# Patient Record
Sex: FEMALE | Race: OTHER | HISPANIC OR LATINO | URBAN - METROPOLITAN AREA
[De-identification: names, ages, dates, MRNs, and addresses within clinical notes are randomized per-mention and may not be internally consistent; named-entity substitution may affect disease eponyms.]

---

## 2019-05-30 ENCOUNTER — APPOINTMENT (RX ONLY)
Dept: URBAN - METROPOLITAN AREA CLINIC 31 | Facility: CLINIC | Age: 59
Setting detail: DERMATOLOGY
End: 2019-05-30

## 2019-05-30 DIAGNOSIS — D485 NEOPLASM OF UNCERTAIN BEHAVIOR OF SKIN: ICD-10-CM

## 2019-05-30 PROBLEM — D48.5 NEOPLASM OF UNCERTAIN BEHAVIOR OF SKIN: Status: ACTIVE | Noted: 2019-05-30

## 2019-05-30 PROCEDURE — ? BIOPSY BY SHAVE METHOD

## 2019-05-30 PROCEDURE — 11102 TANGNTL BX SKIN SINGLE LES: CPT

## 2019-05-30 ASSESSMENT — LOCATION DETAILED DESCRIPTION DERM: LOCATION DETAILED: LEFT LATERAL CANTHUS

## 2019-05-30 ASSESSMENT — LOCATION SIMPLE DESCRIPTION DERM: LOCATION SIMPLE: LEFT EYELID

## 2019-05-30 ASSESSMENT — LOCATION ZONE DERM: LOCATION ZONE: EYELID

## 2019-05-30 NOTE — PROCEDURE: BIOPSY BY SHAVE METHOD
Detail Level: Detailed
Biopsy Method: Dermablade
Bill For Surgical Tray: no
Lab: [id number]
Curettage Text: The wound bed was treated with curettage after the biopsy was performed.
Notification Instructions: Patient will be notified of biopsy results. However, patient instructed to call the office if not contacted within 2 weeks.
Cryotherapy Text: The wound bed was treated with cryotherapy after the biopsy was performed.
Consent: Written consent was obtained and risks were reviewed including but not limited to scarring, infection, bleeding, scabbing, incomplete removal, nerve damage and allergy to anesthesia.
Additional Anesthesia Volume In Cc (Will Not Render If 0): 0
Size Of Lesion In Cm: 0.4
Anesthesia Type: 1% lidocaine with epinephrine
Lab Facility: [id number]
Depth Of Biopsy: dermis
Electrodesiccation Text: The wound bed was treated with electrodesiccation after the biopsy was performed.
Wound Care: Petrolatum
Type Of Destruction Used: Curettage
Anesthesia Volume In Cc: 0.5
Electrodesiccation And Curettage Text: The wound bed was treated with electrodesiccation and curettage after the biopsy was performed.
Billing Type: Third-Party Bill
Biopsy Type: H and E
Silver Nitrate Text: The wound bed was treated with silver nitrate after the biopsy was performed.
Post-Care Instructions: I reviewed with the patient in detail post-care instructions. Patient is to keep the biopsy site dry overnight, and then apply bacitracin twice daily until healed. Patient may apply hydrogen peroxide soaks to remove any crusting.
Dressing: bandage
Hemostasis: Drysol and Electrocautery
Was A Bandage Applied: Yes

## 2019-07-31 PROBLEM — M75.41 IMPINGEMENT SYNDROME OF RIGHT SHOULDER: Status: ACTIVE | Noted: 2019-07-31

## 2020-04-29 ENCOUNTER — APPOINTMENT (RX ONLY)
Dept: URBAN - METROPOLITAN AREA CLINIC 31 | Facility: CLINIC | Age: 60
Setting detail: DERMATOLOGY
End: 2020-04-29

## 2020-04-29 DIAGNOSIS — D485 NEOPLASM OF UNCERTAIN BEHAVIOR OF SKIN: ICD-10-CM

## 2020-04-29 PROBLEM — D48.5 NEOPLASM OF UNCERTAIN BEHAVIOR OF SKIN: Status: ACTIVE | Noted: 2020-04-29

## 2020-04-29 PROCEDURE — 11102 TANGNTL BX SKIN SINGLE LES: CPT

## 2020-04-29 PROCEDURE — ? BIOPSY BY SHAVE METHOD

## 2020-04-29 ASSESSMENT — LOCATION ZONE DERM: LOCATION ZONE: FACE

## 2020-04-29 ASSESSMENT — LOCATION DETAILED DESCRIPTION DERM: LOCATION DETAILED: LEFT CENTRAL TEMPLE

## 2020-04-29 ASSESSMENT — LOCATION SIMPLE DESCRIPTION DERM: LOCATION SIMPLE: LEFT TEMPLE

## 2020-04-29 NOTE — PROCEDURE: BIOPSY BY SHAVE METHOD
Detail Level: Detailed
Depth Of Biopsy: dermis
Was A Bandage Applied: Yes
Size Of Lesion In Cm: 0.3
X Size Of Lesion In Cm: 0
Biopsy Type: H and E
Biopsy Method: Dermablade
Anesthesia Type: 1% lidocaine with epinephrine
Anesthesia Volume In Cc: 0.5
Hemostasis: Drysol and Electrocautery
Wound Care: Aquaphor
Dressing: bandage
Destruction After The Procedure: No
Type Of Destruction Used: Curettage
Curettage Text: The wound bed was treated with curettage after the biopsy was performed.
Cryotherapy Text: The wound bed was treated with cryotherapy after the biopsy was performed.
Electrodesiccation Text: The wound bed was treated with electrodesiccation after the biopsy was performed.
Electrodesiccation And Curettage Text: The wound bed was treated with electrodesiccation and curettage after the biopsy was performed.
Silver Nitrate Text: The wound bed was treated with silver nitrate after the biopsy was performed.
Lab: [id number]
Lab Facility: 
Consent: Written consent was obtained and risks were reviewed including but not limited to scarring, infection, bleeding, scabbing, incomplete removal, nerve damage and allergy to anesthesia.
Post-Care Instructions: I reviewed with the patient in detail post-care instructions. Patient is to keep the biopsy site dry overnight, and then apply bacitracin twice daily until healed. Patient may apply hydrogen peroxide soaks to remove any crusting.
Notification Instructions: Patient will be notified of biopsy results. However, patient instructed to call the office if not contacted within 2 weeks.
Billing Type: Third-Party Bill
Information: Selecting Yes will display possible errors in your note based on the variables you have selected. This validation is only offered as a suggestion for you. PLEASE NOTE THAT THE VALIDATION TEXT WILL BE REMOVED WHEN YOU FINALIZE YOUR NOTE. IF YOU WANT TO FAX A PRELIMINARY NOTE YOU WILL NEED TO TOGGLE THIS TO 'NO' IF YOU DO NOT WANT IT IN YOUR FAXED NOTE.

## 2022-04-12 ENCOUNTER — APPOINTMENT (OUTPATIENT)
Dept: ADMISSIONS | Facility: MEDICAL CENTER | Age: 62
End: 2022-04-12
Payer: COMMERCIAL

## 2022-04-15 ENCOUNTER — PRE-ADMISSION TESTING (OUTPATIENT)
Dept: ADMISSIONS | Facility: MEDICAL CENTER | Age: 62
End: 2022-04-15
Attending: OBSTETRICS & GYNECOLOGY
Payer: COMMERCIAL

## 2022-04-20 ENCOUNTER — ANESTHESIA EVENT (OUTPATIENT)
Dept: SURGERY | Facility: MEDICAL CENTER | Age: 62
End: 2022-04-20
Payer: COMMERCIAL

## 2022-04-20 ENCOUNTER — ANESTHESIA (OUTPATIENT)
Dept: SURGERY | Facility: MEDICAL CENTER | Age: 62
End: 2022-04-20
Payer: COMMERCIAL

## 2022-04-20 ENCOUNTER — HOSPITAL ENCOUNTER (OUTPATIENT)
Facility: MEDICAL CENTER | Age: 62
End: 2022-04-20
Attending: OBSTETRICS & GYNECOLOGY | Admitting: OBSTETRICS & GYNECOLOGY
Payer: COMMERCIAL

## 2022-04-20 VITALS
HEIGHT: 65 IN | SYSTOLIC BLOOD PRESSURE: 148 MMHG | DIASTOLIC BLOOD PRESSURE: 78 MMHG | OXYGEN SATURATION: 95 % | TEMPERATURE: 96.9 F | HEART RATE: 73 BPM | BODY MASS INDEX: 28.61 KG/M2 | WEIGHT: 171.74 LBS | RESPIRATION RATE: 15 BRPM

## 2022-04-20 LAB — PATHOLOGY CONSULT NOTE: NORMAL

## 2022-04-20 PROCEDURE — 501330 HCHG SET, CYSTO IRRIG TUBING: Performed by: OBSTETRICS & GYNECOLOGY

## 2022-04-20 PROCEDURE — 700101 HCHG RX REV CODE 250: Performed by: OBSTETRICS & GYNECOLOGY

## 2022-04-20 PROCEDURE — 160025 RECOVERY II MINUTES (STATS): Performed by: OBSTETRICS & GYNECOLOGY

## 2022-04-20 PROCEDURE — 700101 HCHG RX REV CODE 250: Performed by: ANESTHESIOLOGY

## 2022-04-20 PROCEDURE — 160046 HCHG PACU - 1ST 60 MINS PHASE II: Performed by: OBSTETRICS & GYNECOLOGY

## 2022-04-20 PROCEDURE — 160047 HCHG PACU  - EA ADDL 30 MINS PHASE II: Performed by: OBSTETRICS & GYNECOLOGY

## 2022-04-20 PROCEDURE — 700111 HCHG RX REV CODE 636 W/ 250 OVERRIDE (IP): Performed by: ANESTHESIOLOGY

## 2022-04-20 PROCEDURE — 500892 HCHG PACK, PERI-GYN: Performed by: OBSTETRICS & GYNECOLOGY

## 2022-04-20 PROCEDURE — C1771 REP DEV, URINARY, W/SLING: HCPCS | Performed by: OBSTETRICS & GYNECOLOGY

## 2022-04-20 PROCEDURE — 700105 HCHG RX REV CODE 258: Performed by: ANESTHESIOLOGY

## 2022-04-20 PROCEDURE — 700111 HCHG RX REV CODE 636 W/ 250 OVERRIDE (IP): Performed by: OBSTETRICS & GYNECOLOGY

## 2022-04-20 PROCEDURE — 110454 HCHG SHELL REV 250: Performed by: OBSTETRICS & GYNECOLOGY

## 2022-04-20 PROCEDURE — 160036 HCHG PACU - EA ADDL 30 MINS PHASE I: Performed by: OBSTETRICS & GYNECOLOGY

## 2022-04-20 PROCEDURE — 160029 HCHG SURGERY MINUTES - 1ST 30 MINS LEVEL 4: Performed by: OBSTETRICS & GYNECOLOGY

## 2022-04-20 PROCEDURE — 88304 TISSUE EXAM BY PATHOLOGIST: CPT

## 2022-04-20 PROCEDURE — 160048 HCHG OR STATISTICAL LEVEL 1-5: Performed by: OBSTETRICS & GYNECOLOGY

## 2022-04-20 PROCEDURE — 160002 HCHG RECOVERY MINUTES (STAT): Performed by: OBSTETRICS & GYNECOLOGY

## 2022-04-20 PROCEDURE — 88305 TISSUE EXAM BY PATHOLOGIST: CPT

## 2022-04-20 PROCEDURE — 160041 HCHG SURGERY MINUTES - EA ADDL 1 MIN LEVEL 4: Performed by: OBSTETRICS & GYNECOLOGY

## 2022-04-20 PROCEDURE — 501838 HCHG SUTURE GENERAL: Performed by: OBSTETRICS & GYNECOLOGY

## 2022-04-20 PROCEDURE — 160035 HCHG PACU - 1ST 60 MINS PHASE I: Performed by: OBSTETRICS & GYNECOLOGY

## 2022-04-20 PROCEDURE — 00860 ANES XTRPRTL PX LWR ABD NOS: CPT | Performed by: ANESTHESIOLOGY

## 2022-04-20 PROCEDURE — 160009 HCHG ANES TIME/MIN: Performed by: OBSTETRICS & GYNECOLOGY

## 2022-04-20 PROCEDURE — 700105 HCHG RX REV CODE 258: Performed by: OBSTETRICS & GYNECOLOGY

## 2022-04-20 DEVICE — SYSTEM SUPRAPUBIC MID-URETHRAL SLING LYNX: Type: IMPLANTABLE DEVICE | Site: BLADDER | Status: FUNCTIONAL

## 2022-04-20 RX ORDER — HYDROMORPHONE HYDROCHLORIDE 1 MG/ML
0.2 INJECTION, SOLUTION INTRAMUSCULAR; INTRAVENOUS; SUBCUTANEOUS
Status: DISCONTINUED | OUTPATIENT
Start: 2022-04-20 | End: 2022-04-20 | Stop reason: HOSPADM

## 2022-04-20 RX ORDER — HALOPERIDOL 5 MG/ML
1 INJECTION INTRAMUSCULAR
Status: DISCONTINUED | OUTPATIENT
Start: 2022-04-20 | End: 2022-04-20 | Stop reason: HOSPADM

## 2022-04-20 RX ORDER — ONDANSETRON 2 MG/ML
4 INJECTION INTRAMUSCULAR; INTRAVENOUS ONCE
Status: DISCONTINUED | OUTPATIENT
Start: 2022-04-20 | End: 2022-04-20 | Stop reason: HOSPADM

## 2022-04-20 RX ORDER — OXYCODONE HCL 5 MG/5 ML
10 SOLUTION, ORAL ORAL
Status: DISCONTINUED | OUTPATIENT
Start: 2022-04-20 | End: 2022-04-20 | Stop reason: HOSPADM

## 2022-04-20 RX ORDER — HYDROMORPHONE HYDROCHLORIDE 1 MG/ML
0.4 INJECTION, SOLUTION INTRAMUSCULAR; INTRAVENOUS; SUBCUTANEOUS
Status: DISCONTINUED | OUTPATIENT
Start: 2022-04-20 | End: 2022-04-20 | Stop reason: HOSPADM

## 2022-04-20 RX ORDER — MEPERIDINE HYDROCHLORIDE 25 MG/ML
12.5 INJECTION INTRAMUSCULAR; INTRAVENOUS; SUBCUTANEOUS
Status: DISCONTINUED | OUTPATIENT
Start: 2022-04-20 | End: 2022-04-20 | Stop reason: HOSPADM

## 2022-04-20 RX ORDER — PROMETHAZINE HYDROCHLORIDE 25 MG/1
25 SUPPOSITORY RECTAL EVERY 4 HOURS PRN
Status: DISCONTINUED | OUTPATIENT
Start: 2022-04-20 | End: 2022-04-20 | Stop reason: HOSPADM

## 2022-04-20 RX ORDER — DEXAMETHASONE SODIUM PHOSPHATE 4 MG/ML
INJECTION, SOLUTION INTRA-ARTICULAR; INTRALESIONAL; INTRAMUSCULAR; INTRAVENOUS; SOFT TISSUE PRN
Status: DISCONTINUED | OUTPATIENT
Start: 2022-04-20 | End: 2022-04-20 | Stop reason: SURG

## 2022-04-20 RX ORDER — SODIUM CHLORIDE, SODIUM LACTATE, POTASSIUM CHLORIDE, CALCIUM CHLORIDE 600; 310; 30; 20 MG/100ML; MG/100ML; MG/100ML; MG/100ML
INJECTION, SOLUTION INTRAVENOUS CONTINUOUS
Status: ACTIVE | OUTPATIENT
Start: 2022-04-20 | End: 2022-04-20

## 2022-04-20 RX ORDER — SODIUM CHLORIDE, SODIUM LACTATE, POTASSIUM CHLORIDE, CALCIUM CHLORIDE 600; 310; 30; 20 MG/100ML; MG/100ML; MG/100ML; MG/100ML
INJECTION, SOLUTION INTRAVENOUS CONTINUOUS
Status: DISCONTINUED | OUTPATIENT
Start: 2022-04-20 | End: 2022-04-20 | Stop reason: HOSPADM

## 2022-04-20 RX ORDER — VANCOMYCIN HYDROCHLORIDE 500 MG/10ML
INJECTION, POWDER, LYOPHILIZED, FOR SOLUTION INTRAVENOUS
Status: COMPLETED | OUTPATIENT
Start: 2022-04-20 | End: 2022-04-20

## 2022-04-20 RX ORDER — SODIUM CHLORIDE, SODIUM LACTATE, POTASSIUM CHLORIDE, CALCIUM CHLORIDE 600; 310; 30; 20 MG/100ML; MG/100ML; MG/100ML; MG/100ML
INJECTION, SOLUTION INTRAVENOUS
Status: DISCONTINUED | OUTPATIENT
Start: 2022-04-20 | End: 2022-04-20 | Stop reason: SURG

## 2022-04-20 RX ORDER — PREGABALIN 150 MG/1
150 CAPSULE ORAL EVERY EVENING
COMMUNITY

## 2022-04-20 RX ORDER — ESTRADIOL 0.1 MG/G
CREAM VAGINAL
Status: ON HOLD | COMMUNITY
Start: 2022-03-18 | End: 2022-04-20

## 2022-04-20 RX ORDER — ONDANSETRON 2 MG/ML
INJECTION INTRAMUSCULAR; INTRAVENOUS PRN
Status: DISCONTINUED | OUTPATIENT
Start: 2022-04-20 | End: 2022-04-20 | Stop reason: SURG

## 2022-04-20 RX ORDER — HYDROMORPHONE HYDROCHLORIDE 1 MG/ML
0.1 INJECTION, SOLUTION INTRAMUSCULAR; INTRAVENOUS; SUBCUTANEOUS
Status: DISCONTINUED | OUTPATIENT
Start: 2022-04-20 | End: 2022-04-20 | Stop reason: HOSPADM

## 2022-04-20 RX ORDER — OXYCODONE HCL 5 MG/5 ML
5 SOLUTION, ORAL ORAL
Status: DISCONTINUED | OUTPATIENT
Start: 2022-04-20 | End: 2022-04-20 | Stop reason: HOSPADM

## 2022-04-20 RX ORDER — CEFAZOLIN SODIUM 1 G/3ML
INJECTION, POWDER, FOR SOLUTION INTRAMUSCULAR; INTRAVENOUS PRN
Status: DISCONTINUED | OUTPATIENT
Start: 2022-04-20 | End: 2022-04-20 | Stop reason: SURG

## 2022-04-20 RX ORDER — DIPHENHYDRAMINE HYDROCHLORIDE 50 MG/ML
12.5 INJECTION INTRAMUSCULAR; INTRAVENOUS
Status: DISCONTINUED | OUTPATIENT
Start: 2022-04-20 | End: 2022-04-20 | Stop reason: HOSPADM

## 2022-04-20 RX ORDER — BUPIVACAINE HYDROCHLORIDE AND EPINEPHRINE 2.5; 5 MG/ML; UG/ML
INJECTION, SOLUTION EPIDURAL; INFILTRATION; INTRACAUDAL; PERINEURAL
Status: DISCONTINUED | OUTPATIENT
Start: 2022-04-20 | End: 2022-04-20 | Stop reason: HOSPADM

## 2022-04-20 RX ORDER — GENTAMICIN SULFATE 40 MG/ML
INJECTION, SOLUTION INTRAMUSCULAR; INTRAVENOUS
Status: DISCONTINUED | OUTPATIENT
Start: 2022-04-20 | End: 2022-04-20 | Stop reason: HOSPADM

## 2022-04-20 RX ADMIN — DEXAMETHASONE SODIUM PHOSPHATE 8 MG: 4 INJECTION, SOLUTION INTRA-ARTICULAR; INTRALESIONAL; INTRAMUSCULAR; INTRAVENOUS; SOFT TISSUE at 15:22

## 2022-04-20 RX ADMIN — FENTANYL CITRATE 50 MCG: 50 INJECTION, SOLUTION INTRAMUSCULAR; INTRAVENOUS at 15:10

## 2022-04-20 RX ADMIN — ROCURONIUM BROMIDE 50 MG: 10 INJECTION, SOLUTION INTRAVENOUS at 15:12

## 2022-04-20 RX ADMIN — FENTANYL CITRATE 50 MCG: 50 INJECTION, SOLUTION INTRAMUSCULAR; INTRAVENOUS at 15:53

## 2022-04-20 RX ADMIN — PROPOFOL 100 MG: 10 INJECTION, EMULSION INTRAVENOUS at 15:36

## 2022-04-20 RX ADMIN — ONDANSETRON 8 MG: 2 INJECTION INTRAMUSCULAR; INTRAVENOUS at 15:23

## 2022-04-20 RX ADMIN — CEFAZOLIN 2 G: 330 INJECTION, POWDER, FOR SOLUTION INTRAMUSCULAR; INTRAVENOUS at 15:15

## 2022-04-20 RX ADMIN — SUGAMMADEX 200 MG: 100 INJECTION, SOLUTION INTRAVENOUS at 15:51

## 2022-04-20 RX ADMIN — PROPOFOL 200 MG: 10 INJECTION, EMULSION INTRAVENOUS at 15:11

## 2022-04-20 RX ADMIN — SODIUM CHLORIDE, POTASSIUM CHLORIDE, SODIUM LACTATE AND CALCIUM CHLORIDE 1000 ML: 600; 310; 30; 20 INJECTION, SOLUTION INTRAVENOUS at 13:31

## 2022-04-20 RX ADMIN — SODIUM CHLORIDE, POTASSIUM CHLORIDE, SODIUM LACTATE AND CALCIUM CHLORIDE: 600; 310; 30; 20 INJECTION, SOLUTION INTRAVENOUS at 15:02

## 2022-04-20 ASSESSMENT — PAIN DESCRIPTION - PAIN TYPE
TYPE: SURGICAL PAIN

## 2022-04-20 ASSESSMENT — PAIN SCALES - GENERAL: PAIN_LEVEL: 1

## 2022-04-20 NOTE — ANESTHESIA POSTPROCEDURE EVALUATION
Patient: Valery Roa    Procedure Summary     Date: 04/20/22 Room / Location: [place] [address] / SURGERY [facility]    Anesthesia Start: 1502 Anesthesia Stop: 1605    Procedure: BLADDER SLING, FEMALE - REMOVAL TRANS OBTERATOR TAPE, PLACEMENT URETHRAL TRANS VAGINAL SLING (N/A ) Diagnosis: (INCOMPLETE BLADDER EMPTYING, OVERFLOW INCONTINENCE, URETHRAL HYPERMOBILITY)    Surgeons: Alisson Hartley M.D. Responsible Provider: Timbo Ng M.D.    Anesthesia Type: general ASA Status: 2          Final Anesthesia Type: general  Last vitals  BP   Blood Pressure: 157/79    Temp   36.4 °C (97.5 °F)    Pulse   64   Resp   16    SpO2   97 %      Anesthesia Post Evaluation    Patient location during evaluation: PACU  Patient participation: complete - patient participated  Level of consciousness: awake and alert  Pain score: 1    Airway patency: patent  Anesthetic complications: no  Cardiovascular status: adequate and hemodynamically stable  Respiratory status: acceptable  Hydration status: acceptable    PONV: none          No complications documented.     Nurse Pain Score: 0 (NPRS)

## 2022-04-20 NOTE — OR SURGEON
Op Note    PreOp Diagnosis: Prior TOT placed by Dr. Christopher in 2015, this is palpable in the wrong place, underlying the mid bladder, patient with continued RADHA/ISD, desires removal and placement of TVT      PostOp Diagnosis: Same, prior sling underlying the mid bladder, not tight      Procedure(s):  BLADDER SLING, FEMALE - REMOVAL TRANS OBTERATOR TAPE,   PLACEMENT RETROPUBIC SLING (LYNX)    Surgeon(s):  Alisson Hartley M.D.    Assistant: ANASTASIIA Hamm    Anesthesiologist/Type of Anesthesia:  Anesthesiologist: Timbo Ng M.D./* No anesthesia type entered *    Surgical Staff:  Assistant: Guille Horan R.N.  Circulator: Mare Wyatt R.N.  Scrub Person: Faye Polo  [place]: Marce Osborne R.N.    Specimens removed if any:  ID Type Source Tests Collected by Time Destination   A : TOT URETHERAL SLING, UNDERLYING BLADDER INSTEAD OF URETHRA  Other Other GROSS ONLY REQUEST Alisson Hartley M.D. 4/20/2022  3:30 PM        Estimated Blood Loss: Min    Findings: The prior sling placed by Dr. Christopher is palpable through the vaginal mucosa at the level of the mid bladder, this was dissected out without difficulty thru a separate incision. The retropubic sling was placed tension free underlying the mid urethra, normal bladder on cystoscopy    Complications: None    Technique: The patient was taken to the operating room where general anesthesia was placed.  She was then prepped and draped in the normal sterile fashion in the [facility] with excellent positioning.  A Davis catheter was then placed an exam under anesthesia was performed.  The graft placed by Dr. Christopher in 2015 was noted to be underlying the mid bladder area below the vaginal mucosa.  An Allis clamp was used to grasp the vaginal mucosa just proximal to the bladder neck and quarter percent Marcaine with epinephrine was then injected into the vaginal mucosa overlying this graft.  A 1 cm incision was made with a scalpel  and Metzenbaum scissors were then used to dissect out just underneath the vaginal mucosa on each side and I was able to palpate the sling.  This was grasped with a Allis clamp and undermined in the midline.  I then cut the graft in the midline and dissected the graft out laterally to the pubic bone and cut the graft free on each side.  I then used an 0 Vicryl to imbricate the bladder in this area.   irrigation was then performed Surgiflo was placed into the dissected areas and 3-0 Vicryl was used to close the vaginal Koza in a running fashion.  Excellent reapproximation was achieved.    Attention was then turned to placement of the retropubic sling.  An Allis clamp was used to grasp the vaginal mucosa underlying the mid urethra.  Quarter percent Marcaine with epinephrine was then injected into this area and out laterally on each side.  A 1 cm incision was made with the scalpel.  Metzenbaum scissors were then used to dissect out laterally on each side allowing entry of my pinky finger just behind the pubic ramus on each side.  I then palpated the upper aspect of the pubic bone and made a line midline and marked 1-1/2 cm to each side of this midline lili at the level of the pubic symphysis.  Quarter percent Marcaine with epinephrine was then injected into these lateral incisions and a stab incision was made with a scalpel on each side.  The retropubic needle was then placed through the small incision and slid down the back of the pubic bone until I could feel it periurethrally and brought it through periurethrally.  This was done on both sides.  With the needles in place I then did a cystoscopic examination of the bladder.  The bladder appeared normal with no interruption in the of the bladder mucosa.  The cystoscope was then removed and Davis catheter placed I waited until the bladder was completely drained before attaching the graft to both needles and then bringing these needles out through the suprapubic  incisions.  This created a support underneath the urethra.  I did place an 8 mm dilator above the graft from below the urethra to ensure tension for the positioning.  The plastic sheath was then removed and the graft was noted to be tension-free underlying the mid urethra.   irrigation was performed and surgical was placed into the dissected areas.  3-0 Vicryl was used to close the vaginal mucosa in a running fashion.  Dermabond was used to close the suprapubic incisions.  The Davis catheter was replaced.  The patient tolerated the procedure well and was brought to recovery in stable condition.        4/20/2022 3:58 PM Alisson Hartley M.D.

## 2022-04-20 NOTE — PROGRESS NOTES
Med rec complete per pt with family interpreting at bedside  Interviewed pt with family at bedside with permission from pt  Allergies reviewed and updated.

## 2022-04-20 NOTE — ANESTHESIA PROCEDURE NOTES
Airway    Date/Time: 4/20/2022 3:15 PM  Performed by: Timbo Ng M.D.  Authorized by: Timbo Ng M.D.     Location:  OR  Urgency:  Elective  Indications for Airway Management:  Anesthesia      Spontaneous Ventilation: absent    Sedation Level:  Deep  Preoxygenated: Yes    Patient Position:  Sniffing  Final Airway Type:  Endotracheal airway  Final Endotracheal Airway:  ETT  Cuffed: Yes    Technique Used for Successful ETT Placement:  Direct laryngoscopy    Insertion Site:  Oral  Blade Type:  Fisher  Laryngoscope Blade/Videolaryngoscope Blade Size:  2  ETT Size (mm):  7.5  Measured from:  Teeth  ETT to Teeth (cm):  22  Placement Verified by: auscultation and capnometry    Cormack-Lehane Classification:  Grade I - full view of glottis  Number of Attempts at Approach:  1

## 2022-04-20 NOTE — ANESTHESIA PREPROCEDURE EVALUATION
Case: [id number] Date/Time: 04/20/22 1415    Procedure: BLADDER SLING, FEMALE - REMOVAL TRANS OBTERATOR TAPE, PLACEMENT URETHRAL TRANS VAGINAL SLING    Pre-op diagnosis: INCOMPLETE BLADDER EMPTYING, OVERFLOW INCONTINENCE, URETHRAL HYPERMOBILITY    Location: [place] OR 17 / SURGERY [facility]    Surgeons: Alisson Hartley M.D.          Relevant Problems   Other   (positive) Impingement syndrome of right shoulder       Physical Exam    Airway   Mallampati: II  TM distance: >3 FB  Neck ROM: full       Cardiovascular - normal exam  Rhythm: regular  Rate: normal  (-) murmur     Dental - normal exam           Pulmonary - normal exam  Breath sounds clear to auscultation     Abdominal    Neurological - normal exam                 Anesthesia Plan    ASA 2       Plan - general       Airway plan will be ETT          Induction: intravenous    Postoperative Plan: Postoperative administration of opioids is intended.    Pertinent diagnostic labs and testing reviewed    Informed Consent:    Anesthetic plan and risks discussed with patient.    Use of blood products discussed with: patient whom consented to blood products.

## 2022-04-20 NOTE — ANESTHESIA TIME REPORT
Anesthesia Start and Stop Event Times     Date Time Event    4/20/2022 1329 Ready for Procedure     1502 Anesthesia Start     1605 Anesthesia Stop        Responsible Staff  04/20/22    Name Role Begin End    Timbo Ng M.D. Anesth 1502 1605        Overtime Reason:  overtime    Comments:

## 2022-04-20 NOTE — H&P
CHIEF COMPLAINT:  Status post surgery with Dr. Christopher including a   transobturator tape urethral sling, now patient is having difficulty emptying   her bladder and leaking in between voids, sling is palpably in the wrong   place, right lower quadrant pain and some fullness.     HISTORY OF PRESENT ILLNESS:  The patient is a 61-year-old  who underwent   surgery with Dr. Christopher including an anterior and posterior repair,   perineorrhaphy, sacrospinous ligament fixation and transobturator tape   urethral sling in .  The patient had a hysterectomy LSO at age 43.  The   patient was seen by Dr. Christopher in 2018 and had urodynamic testing and   recommended no further followup despite the patient having leaking of urine   and frustrated with inability to empty her bladder.  The patient states that   she has had leakage of urine since  and the procedure was of no help.  The   patient states that she has leaking with coughing, sneezing, walking and   wears pads.  She has to change her clothes, changes the pad 4-5 times a day,   voids every 1-2 hours and does not feel like she empties.  The patient also   has pain after she voids spasm or poking pain.  She does have bowel movements   regularly daily and denies any splinting.  She has had to be hospitalized   several months ago with a urinary tract infection and was given antibiotics.    On exam, the sling is palpable custodial up the vagina and there is no erosion.    Because the sling is in the proper place, so it was discussed with the   patient to remove this and place a retropubic sling.  She is in agreement and   would like to proceed.     PAST MEDICAL HISTORY:  None.     PAST SURGICAL HISTORY:  An appendectomy in , with tubal in ____.  In ,   had repairs with Dr. Christopher and prior to this, has had hysterectomy.     MENSTRUAL HISTORY:  The patient underwent menarche at age 12.  She went   through menopause at age 49.     OBSTETRICAL HISTORY:  She has  had three pregnancies and all three vaginally,   the last one in , she had a tubal in ____.     FAMILY HISTORY:  Noncontributory.     SOCIAL HISTORY:  The patient does not smoke, drink or do drugs.     ALLERGIES:  No known drug allergies.     PHYSICAL EXAMINATION:  VITAL SIGNS:  The patient's blood pressure is 130/82, her weight is 175,   height is 5 feet 5 inches, BMI is 29.  HEENT:  Within normal limits.  NECK:  Supple, without lymphadenopathy or thyromegaly.  CARDIOVASCULAR:  Regular rate and rhythm.  LUNGS:  Clear to auscultation.  BACK:  No CVA tenderness.  ABDOMEN:  Soft and slightly tender in the right lower quadrant.  There are no   adnexal masses.  PELVIC:  EGBUS appears fairly normal, although she does have some atrophy.    The patient has some discomfort in the right lower quadrant with palpation.    The movement of the urethra down and upward with Valsalva is noted.  The sling   is too far inside the vagina beyond the bladder neck even.  There is some   fullness on the right lower quadrant.     ASSESSMENT:  1.  A 61-year-old , postmenopausal female, not on hormone replacement   therapy.  2.  She is status post anterior and posterior repair, sacrospinous ligament   fixation and transobturator tape urethral sling with Dr. Christopher in 2015.  3.  There was no improvement in her bladder symptoms after the surgery.  Now,   she is incompletely emptying her bladder and leaking with activity.  On exam,   the sling appears to be in the wrong place, right lower quadrant pain and some   fullness.     PLAN:  The patient is scheduled for removal of her prior sling and placement   of a retropubic sling.  Risks, benefits, alternatives and procedure were   discussed with the patient in detail and she agrees to proceed.  Preoperative   labs will be obtained.  Preoperative antibiotics will be administered.  She   was given a prescription for Percocet #10 and Phenergan suppositories #6.  If   she has any problems or  questions, she can contact my office.        ______________________________  MD GISSELLE Faulkner/VICKY/SUGEY    DD:  04/19/2022 22:00  DT:  04/19/2022 22:51    Job#:  [id number]

## 2022-04-21 NOTE — OR NURSING
Dr Hartley telephoned, pt to take OTC tylenol or Ibuprofen as needed for pain, pt ambulated in halls, denies pain, nausea or SOB, 250ml NS bladder backfill per MD orders, mcbride removed, pt unable ot void, will replace mcbride per MD orders.

## 2022-04-21 NOTE — OR NURSING
Pt unable ot void, #16 fr indwelling mcbride cathter inserted per MD orders, to gravity drainage, discharge instructions, mcbride catheter care reviewed with pt and daughter, they verbalized understanding of instructions, PIV removed, tip intact, discharged via w/c to home with daughter at 1920.

## 2022-04-21 NOTE — OR NURSING
Pt and daughter states pt has nausea and vomiting when taking Oxycodone for pain, requests pain medication Rx be changed to Tramadol, have paged Dr Hartley.

## 2022-04-21 NOTE — OR NURSING
Pt arrived in Phase 2 at 1723, resting on [facility], [facility], 2 suprapubic incisions approximated with derma bond, clean, dry and intact, denies pain, nausea or SOB, indwelling mcbride catheter in place, mesh panties and norm pad in place with small serous sanguinous drainage on pad, daughter telephoned regarding update, personal belongings at bedside, will monitor.

## 2022-04-21 NOTE — OR NURSING
Patient awake and alert and tolerating water without issue. VSS. 2 lap sites to lower abd are closed with dermabond and CDI. Pt resting in bed and denies pain and states nausea is mild. Pt does not want meds for nausea. Pt has been updated on plan of care and all questions have been addressed. Pt daughter called and updated on plan of care.     Report called to Carlos BOND. Pt taken to stage 2 in stable condition

## 2023-08-15 NOTE — DISCHARGE INSTRUCTIONS
ACTIVITY: Rest and take it easy for the first 24 hours.  A responsible adult is recommended to remain with you during that time.  It is normal to feel sleepy.  We encourage you to not do anything that requires balance, judgment or coordination.    MILD FLU-LIKE SYMPTOMS ARE NORMAL. YOU MAY EXPERIENCE GENERALIZED MUSCLE ACHES, THROAT IRRITATION, HEADACHE AND/OR SOME NAUSEA.    FOR 24 HOURS DO NOT:  Drive, operate machinery or run household appliances.  Drink beer or alcoholic beverages.   Make important decisions or sign legal documents.    SPECIAL INSTRUCTIONS: Follow any additional instructions you may have received from Dr Hartley      Indwelling Urinary Catheter Care, Adult  An indwelling urinary catheter is a thin tube that is put into your bladder. The tube helps to drain pee (urine) out of your body. The tube goes in through your urethra. Your urethra is where pee comes out of your body. Your pee will come out through the catheter, then it will go into a bag (drainage bag).  Take good care of your catheter so it will work well.  How to wear your catheter and bag  Supplies needed  · Sticky tape (adhesive tape) or a leg strap.  · Alcohol wipe or soap and water (if you use tape).  · A clean towel (if you use tape).  · Large overnight bag.  · Smaller bag (leg bag).  Wearing your catheter  Attach your catheter to your leg with tape or a leg strap.  · Make sure the catheter is not pulled tight.  · If a leg strap gets wet, take it off and put on a dry strap.  · If you use tape to hold the bag on your le. Use an alcohol wipe or soap and water to wash your skin where the tape made it sticky before.  2. Use a clean towel to pat-dry that skin.  3. Use new tape to make the bag stay on your leg.  Wearing your bags  You should have been given a large overnight bag.  · You may wear the overnight bag in the day or night.  · Always have the overnight bag lower than your bladder.  Do not let the bag touch the  [Palliative] : Goals of care discussed with patient: Palliative [Palliative Care Plan] : not applicable at this time floor.  · Before you go to sleep, put a clean plastic bag in a wastebasket. Then hang the overnight bag inside the wastebasket.  ·   How to care for your skin and catheter  Supplies needed  · A clean washcloth.  · Water and mild soap.  · A clean towel.  Caring for your skin and catheter     Clean the skin around your catheter every day:  ? Wash your hands with soap and water.  ? Wet a clean washcloth in warm water and mild soap.  ? Clean the skin around your urethra.  ? If you are female:  ? Gently spread the folds of skin around your vagina (labia).  ? With the washcloth in your other hand, wipe the inner side of your labia on each side. Wipe from front to back.  ? If you are male:  ? Pull back any skin that covers the end of your penis (foreskin).  ? With the washcloth in your other hand, wipe your penis in small circles. Start wiping at the tip of your penis, then move away from the catheter.  ? Move the foreskin back in place, if needed.  ? With your free hand, hold the catheter close to where it goes into your body.  ? Keep holding the catheter during cleaning so it does not get pulled out.  ? With the washcloth in your other hand, clean the catheter.  ? Only wipe downward on the catheter.  ? Do not wipe upward toward your body. Doing this may push germs into your urethra and cause infection.  ? Use a clean towel to pat-dry the catheter and the skin around it. Make sure to wipe off all soap.  ? Wash your hands with soap and water.  · Shower every day. Do not take baths.  · Do not use cream, ointment, or lotion on the area where the catheter goes into your body, unless your doctor tells you to.  · Do not use powders, sprays, or lotions on your genital area.  · Check your skin around the catheter every day for signs of infection. Check for:  ? Redness, swelling, or pain.  ? Fluid or blood.  ? Warmth.  ? Pus or a bad smell.  How to empty the bag  Supplies needed  · Rubbing alcohol.  · Gauze pad or cotton  ball.  · Tape or a leg strap.  Emptying the bag  Pour the pee out of your bag when it is ?-½ full, or at least 2-3 times a day. Do this for your overnight bag and your leg bag.  1. Wash your hands with soap and water.  2. Separate (detach) the bag from your leg.  3. Hold the bag over the toilet or a clean pail. Keep the bag lower than your hips and bladder. This is so the pee (urine) does not go back into the tube.  4. Open the pour spout. It is at the bottom of the bag.  5. Empty the pee into the toilet or pail. Do not let the pour spout touch any surface.  6. Put rubbing alcohol on a gauze pad or cotton ball.  7. Use the gauze pad or cotton ball to clean the pour spout.  8. Close the pour spout.  9. Attach the bag to your leg with tape or a leg strap.  10. Wash your hands with soap and water.  Follow instructions for cleaning the drainage bag:  · From the product maker.  · As told by your doctor.  General rules    · Never pull on your catheter. Never try to take it out. Doing that can hurt you.  · Always wash your hands before and after you touch your catheter or bag. Use a mild, fragrance-free soap. If you do not have soap and water, use hand .  · Always make sure there are no twists or bends (kinks) in the catheter tube.  · Always make sure there are no leaks in the catheter or bag.  · Drink enough fluid to keep your pee pale yellow.  · Do not take baths, swim, or use a hot tub.  · If you are female, wipe from front to back after you poop (have a bowel movement).  Contact a doctor if:  · Your pee is cloudy.  · Your pee smells worse than usual.  · Your catheter gets clogged.  · Your catheter leaks.  · Your bladder feels full.  Get help right away if:  · You have redness, swelling, or pain where the catheter goes into your body.  · You have fluid, blood, pus, or a bad smell coming from the area where the catheter goes into your body.  · Your skin feels warm where the catheter goes into your body.  · You  have a fever.  · You have pain in your:  ? Belly (abdomen).  ? Legs.  ? Lower back.  ? Bladder.  · You see blood in the catheter.  · Your pee is pink or red.  · You feel sick to your stomach (nauseous).  · You throw up (vomit).  · You have chills.  · Your pee is not draining into the bag.  · Your catheter gets pulled out.  Summary  · An indwelling urinary catheter is a thin tube that is placed into the bladder to help drain pee (urine) out of the body.  · The catheter is placed into the part of the body that drains pee from the bladder (urethra).  · Taking good care of your catheter will keep it working properly and help prevent problems.  · Always wash your hands before and after touching your catheter or bag.  · Never pull on your catheter or try to take it out.  This information is not intended to replace advice given to you by your health care provider. Make sure you discuss any questions you have with your health care provider.      Urethral Vaginal Sling, Care After  This sheet gives you information about how to care for yourself after your procedure. Your health care provider may also give you more specific instructions. If you have problems or questions, contact your health care provider.  What can I expect after the procedure?  After the procedure:  · It is common to have some abdominal pain. Your health care provider will give you pain medicines for this.  · You may also have a gauze packing in the vagina to prevent bleeding. This will be removed in 1-2 days.  Follow these instructions at home:  Incision care  · Follow instructions from your health care provider about how to take care of your abdominal incision. Make sure you:  ? Wash your hands with soap and water before you change your bandage (dressing). If soap and water are not available, use hand .  ? Change your dressing as told by your health care provider.  ? Leave stitches (sutures), skin glue, or adhesive strips in place. These skin  closures may need to stay in place for 2 weeks or longer. If adhesive strip edges start to loosen and curl up, you may trim the loose edges. Do not remove adhesive strips completely unless your health care provider tells you to do that.  · Check your incision area every day for signs of infection. Check for:  ? Redness, swelling, or pain.  ? Fluid or blood.  ? Warmth.  ? Pus or a bad smell.  Activity  · Get plenty of rest.  · Limit exercise and activities as told by your health care provider.  · Do not lift anything that is heavier than 5 pounds (2.3 kg) until your health care provider says that it is safe.  · Do not douche, use tampons, or have sexual intercourse for 6 weeks after your procedure or as told by your health care provider.  · Do not drive or use heavy machinery while taking prescription pain medicine.  · Return to your normal activities as told by your health care provider. Ask your health care provider what activities are safe for you.  General instructions  · If you have a urinary catheter in place, follow instructions from your health care provider about how to empty the catheter bag.  · Do not take baths, swim, or use a hot tub until your health care provider approves. Ask your health care provider if you may take showers. You may only be allowed to take sponge baths.  · Take over-the-counter and prescription medicines only as told by your health care provider. Do not take aspirin because it can cause bleeding.  · Do not use any products that contain nicotine or tobacco, such as cigarettes and e-cigarettes. These can delay bone healing. If you need help quitting, ask your health care provider.  · You may resume your usual diet. Eat a well-balanced diet.  · To prevent or treat constipation while you are taking prescription pain medicine, your health care provider may recommend that you:  ? Drink enough fluid to keep your urine pale yellow.  ? Take over-the-counter or prescription medicines.  ? Eat  foods that are high in fiber, such as fresh fruits and vegetables, whole grains, and beans.  ? Limit foods that are high in fat and processed sugars, such as fried and sweet foods.  · Avoid straining when having a bowel movement.  · Keep all follow-up visits as told by your health care provider. This is important.  Contact a health care provider if:  · You have a heavy or bad smelling vaginal discharge.  · You have bruising in the vaginal area.  · You have pain that is not controlled with medicines.  · Your incision feels warm to the touch.  · You have redness, swelling, or pain around your incision.  · You have pus or a bad smell coming from your incision.  · You have fluid or blood coming from your incision.  · You feel faint or light-headed.  · You have a rash.  Get help right away if:  · You have a fever.  · You faint.  · You have shortness of breath.  · You have chest, abdominal, or leg pain.  · You have pain when urinating or cannot urinate.  · Your catheter is still in your bladder and it becomes blocked.  · You have vaginal bleeding.  · You have swelling, redness, and pain in the vaginal area.  Summary  · After the procedure, it is common to have some abdominal pain. Your health care provider will give you pain medicines for this.  · Follow instructions from your health care provider about how to take care of your incision.  · Limit exercise and activities as told by your health care provider.  · Contact your health care provider if you have any signs of infection after your surgery.  This information is not intended to replace advice given to you by your health care provider. Make sure you discuss any questions you have with your health care provider.      DIET: To avoid nausea, slowly advance diet as tolerated, avoiding spicy or greasy foods for the first day.  Add more substantial food to your diet according to your physician's instructions.   INCREASE FLUIDS AND FIBER TO AVOID CONSTIPATION.    SURGICAL  DRESSING/BATHING: You may shower, avoid submerging for 24 hours.    FOLLOW-UP APPOINTMENT:  A follow-up appointment should be arranged with your doctor -call to schedule.    You should CALL YOUR PHYSICIAN if you develop:  Fever greater than 101 degrees F.  Pain not relieved by medication, or persistent nausea or vomiting.  Excessive bleeding (blood soaking through dressing) or unexpected drainage from the wound.  Extreme redness or swelling around the incision site, drainage of pus or foul smelling drainage.  Inability to urinate or empty your bladder within 8 hours.  Problems with breathing or chest pain.    You should call 911 if you develop problems with breathing or chest pain.  If you are unable to contact your doctor or surgical center, you should go to the nearest emergency room or urgent care center.      Physician's telephone #: [phone] Dr. Hartley -call her office tomorrow to let them know you have a catheter in your bladder    If any questions arise, call your doctor.  If your doctor is not available, please feel free to call the Surgical Center at [phone].     A registered nurse may call you a few days after your surgery to see how you are doing after your procedure.    MEDICATIONS: Resume taking daily medication.  Take prescribed pain medication with food.  If no medication is prescribed, you may take non-aspirin pain medication if needed.  PAIN MEDICATION CAN BE VERY CONSTIPATING.  Take a stool softener or laxative such as senokot, pericolace, or milk of magnesia if needed.    Prescriptions at home    Take Over The Counter Tylenol and or Ibuprofen as needed for pain    Last pain medication given at none    If your physician has prescribed pain medication that includes Acetaminophen (Tylenol), do not take additional Acetaminophen (Tylenol) while taking the prescribed medication.    Depression / Suicide Risk    As you are discharged from this [facility], it is important to learn  how to keep safe from harming yourself.    Recognize the warning signs:  · Abrupt changes in personality, positive or negative- including increase in energy   · Giving away possessions  · Change in eating patterns- significant weight changes-  positive or negative  · Change in sleeping patterns- unable to sleep or sleeping all the time   · Unwillingness or inability to communicate  · Depression  · Unusual sadness, discouragement and loneliness  · Talk of wanting to die  · Neglect of personal appearance   · Rebelliousness- reckless behavior  · Withdrawal from people/activities they love  · Confusion- inability to concentrate     If you or a loved one observes any of these behaviors or has concerns about self-harm, here's what you can do:  · Talk about it- your feelings and reasons for harming yourself  · Remove any means that you might use to hurt yourself (examples: pills, rope, extension cords, firearm)  · Get professional help from the community (Mental Health, Substance Abuse, psychological counseling)  · Do not be alone:Call your Safe Contact- someone whom you trust who will be there for you.  · Call your local CRISIS HOTLINE 547-4152 or 736-210-5782  · Call your local Children's Mobile Crisis Response Team [place] (019) 428-5483 or www.[employer]  · Call the toll free National Suicide Prevention Hotlines   · National Suicide Prevention Lifeline 369-395-JFNR (7183)  National Hope Line Network 800-SUICIDE (983-2131)Instrucciones Para [place]  (Home Care Instructions)    ACTIVIDAD: Descanse y tome todo con mucha calma las primeras 24 horas después de nguyen cirugía.  [place] persona adulta responsable debe permanecer con usted siena glenn periodo de tiempo.  Es normal sentirse sonoliento o sonolienta siena esas primeras horas.  Le recomendamos que no may nada que requiera equilibrio, ivania decisiones a mucha coordinación de nguyen parte.    NO MAY ESTO PURANTE LAS PRIMERAS 24 HORAS:   Manejar o conducir algún  vehiculo, operar maquinarias o utilizar electrodomesticos.   Beber cerveza o algún otro tipo de bebida alcohólica.   Geovani decisiones importantes o firmar documentos legales.    INSTRUCCIONES ESPECIALES:  Siga las instrucciones adicionales que haya recibido del Dr. Hartley    Cabestrillo uretral vaginal, cuidados posteriores  Esta hoja le kris información sobre cómo cuidarse después del procedimiento. Nguyen proveedor de atención médica también puede darle instrucciones más específicas. Si tiene problemas o preguntas, comuníquese con nguyen proveedor de atención médica.  ¿Qué puedo esperar después del procedimiento?  Después del procedimiento:  Es común tener algo de dolor abdominal. Nguyen proveedor de atención médica le dará analgésicos para esto.  También es posible que tenga un relleno de gasa en la vagina para evitar el sangrado. [place]on se eliminará en 1 o 2 días.    Siga estas instrucciones en casa:  cuidado de la incisión  Siga las instrucciones de nguyen proveedor de atención médica sobre cómo cuidar nguyen incisión abdominal. Asegúrese:  Lávese las peace con agua y jabón antes de cambiar ngueyn vendaje (vendaje). Si no hay agua y jabón disponibles, use desinfectante para peace.  Cambie nguyen vendaje según lo indique nguyen proveedor de atención médica.  Deje puntos (suturas), pegamento para la piel o tiras adhesivas en nguyen lugar. Es posible que estos cierres de piel deban permanecer colocados siena 2 semanas o más. Si los bordes de la leandra adhesiva comienzan a aflojarse y enrollarse, puede recortar los bordes sueltos. No quite las tiras adhesivas por completo a menos que nguyen proveedor de atención médica se lo indique.    Revise el área de nguyen incisión todos los días para detectar signos de infección. Comprobar:  Enrojecimiento, hinchazón o dolor.  Líquido o mikayla.  Calor.  Pus o mal olor.    Actividad  Descansa lo suficiente.  Limite el ejercicio y las actividades según lo indique nguyen proveedor de atención médica.  No levante nada que pese  más de 5 libras (2,3 kg) hasta que nguyen proveedor de atención médica le diga que es seguro hacerlo.  No se duche, use tampones ni tenga relaciones sexuales siena 6 semanas después de nguyen procedimiento o según lo indique nguyen proveedor de atención médica.  No conduzca ni use maquinaria pesada mientras bruce analgésicos recetados.  Regrese a bharathi actividades normales según lo indique nguyen proveedor de atención médica. Pregúntele a nguyen proveedor de atención médica qué actividades son seguras para usted.    Instrucciones generales  Si tiene colocado un catéter urinario, siga las instrucciones de nguyen proveedor de atención médica sobre cómo vaciar la bolsa del catéter.  No se bañe, nade ni use un jacuzzi hasta que nguyen proveedor de atención médica lo apruebe. Pregúntele a nguyen proveedor de atención médica si puede ducharse. Es posible que solo se le permita ivania dilan de esponja.  Ubly los medicamentos de venta chaitanya y recetados solo karmen se lo indique nguyen proveedor de atención médica. No tome aspirina porque puede causar sangrado.  No use ningún producto que contenga nicotina o tabaco, karmen cigarrillos y cigarrillos electrónicos. Estos pueden retrasar la curación ósea. Si necesita ayuda para dejar de fumar, consulte a nguyen proveedor de atención médica.  Puede reanudar nguyen dieta habitual. Coma deirdre dieta antoinette balanceada.    Para prevenir o tratar el estreñimiento mientras bruce medicamentos recetados para el dolor, nguyen proveedor de atención médica puede recomendarle que:  Soraya suficiente líquido para mantener la orina de color amarillo pálido.  Ubly medicamentos de venta chaitanya o recetados.  Coma alimentos ricos en fibra, karmen frutas y verduras frescas, cereales integrales y frijoles.  Limite los alimentos con alto contenido de grasas y azúcares procesados, karmen los alimentos fritos y dulces.  Evite hacer fuerza al defecar.  Asista a todas las visitas de seguimiento según lo indicado por nguyen proveedor de atención médica. [place] es  importante.    Comuníquese con un proveedor de atención médica si:  Tiene un flujo vaginal abundante o con mal olor.  Tiene moretones en el área vaginal.  Tiene dolor que no se controla con medicamentos.  Nguyen incisión se siente caliente al tacto.  Tiene enrojecimiento, hinchazón o dolor alrededor de la incisión.  Tiene pus o mal olor saliendo de nguyen incisión.  Tiene líquido o mikayla saliendo de nguyen incisión.  Se siente débil o mareado.  Tienes un sarpullido      Obtenga ayuda de inmediato si:  Tienes fiebre.  te desmayas  Tiene dificultad para respirar.  Tiene dolor en el pecho, el abdomen o las piernas.  Tiene dolor al orinar o no puede orinar.  Nguyen catéter todavía está en nguyen vejiga y se bloquea.  Tiene sangrado vaginal.  Tiene hinchazón, enrojecimiento y dolor en el área vaginal.    Resumen  Después del procedimiento, es común tener algo de dolor abdominal. Nguyen proveedor de atención médica le dará analgésicos para esto.  Siga las instrucciones de nguyen proveedor de atención médica sobre cómo cuidar nguyen incisión.  Limite el ejercicio y las actividades según lo indique nguyen proveedor de atención médica.  Comuníquese con nguyen proveedor de atención médica si tiene signos de infección después de la cirugía.  Esta información no pretende reemplazar los consejos que le brinde nguyen proveedor de atención médica. Asegúrese de discutir cualquier pregunta que tenga con nguyen proveedor de atención médica.      DIETA: Para evitar las nauseas, prosiga despacito con nguyen dieta a medida que pueda ir tolerándola mejor, evite comidas muy condimentadas o grasosas siena glenn primer día.  Vaya agregando comidas más substanciadas a nguyen dieta a medida que asi lo indique nguyen médica.   SIGA AGREGANDO LIQUIDOS Y COMIDAS CON FIBRA PARA EVITAR ESTREÑIMIENTO.    SHEILA BAÑARSE Y CAMBIAR LOS VENDAJES DE LA CIRUGIA: Puede ducharse, evite sumergirse siena 24 horas.    MEDICAMENTOS/MEDICINAS:  Vuelva a ivania bharathi medicamentos diarios.  [place] los medicamentos que se le  prescribe con un poco de comida.  Si no le prescribe ningún tipo de medicamento, entonces puede ivania medicinas para el dolor que no contienen aspirina, si las necesita.  LAS MEDICINAS PARA EL DOLOR PUEDEN ESTREÑIRLE MUCHO.  [place] un suavizante para el excremento o materia fecal (stool softener) o un laxativo karmen por ejemplo: senokot, pericolase, o leche de magnesia, si lo necesita.    La prescripción la administro recetas en casa.     [place] Tylenol de venta chaitanya o ibuprofeno según sea necesario para el dolor    Último medicamento para el dolor dado en ninguno       Se debe hacer deirdre consulta medica con el doctor-, [place] para hacer la leena.    Usted debe LIAMAR A NGUYEN MEDICO si tiene los siguientes síntomas:   -   Deirdre fiebre más oskar de 101 grados Fahrenheit.   -   Un dolor incesante aún con los medicamentos, o nauseas y vómito persistente.   -   Un sangrado excesivo (mikayla que traspasa los vendajes o gasas) o algúln tipo de drenaje inesperado que proviene de la henda.     -   Un color stevens exagerado o hinchazón alrededor del área en donde se le hizo incisión o inga, o un drenaje de pus o con olor foster proveniente de la henda.   -    La inhabilidad de orinar o vaciar nguyen vejiga en 8 horas.   -    Problemas con a respiración o joyce en el pecho.    Usted debe llamar al 911 si se presentan problemas con el dolor al respirar o el pecho.  Si no se puede ponnoer en comunicación con un medica o con el centro de cirugía, usted debe ir a la estación de emergencia (emergency room) más cercana o a un centro de atención de urgencia (urgent care center).    El teléfono del medico es: [phone] Dr Hartley    LOS SÍNTOMAS DE UN LEVE RESFRIO SON MUY NORMALES.  ADEMÁS USTED PUEDE LLEGAR A SENTIR JOYCE GENERALES DE MÚSCULOS, IRRITACIÓN EN LA GARGANTA, JOYCE DE KAMLESH Y/O UN POCO DE NAUSEAS.    Sie tiene alguna pregunta, llame a nguyen médico.  Si nguyen médico no se encuentra disponible, por favor llame al Centro de Cirugía at  [phone].  el Centro está abierto de Lunes a Viernes desde las 7:00 de la manana hasta las 5:00 de la noche.      Mi firma a continuación indica que he recibido y entiendco estas instrucciones acera de los cuidados en la casa (Home Care Instructions)    · Usted recibirá deirdre encuesta en la correspondencia en las siguientes semanas y le pedimos que por favor tome un momento para completar benoit encuesta y regresaría a hosotros.  Nuestro objetivó es brindarle un cuidado muy walker y par lo tanto apreciamos bharathi coméntanos.  Muchas aurea por yamileth escogido el [facility].

## (undated) DEVICE — TRAY CATHETER FOLEY URINE METER W/STATLOCK 350ML (10EA/CA)

## (undated) DEVICE — Device

## (undated) DEVICE — ELECTRODE DUAL RETURN W/ CORD - (50/PK)

## (undated) DEVICE — SET EXTENSION WITH 2 PORTS (48EA/CA) ***PART #2C8610 IS A SUBSTITUTE*****

## (undated) DEVICE — SUTURE GENERAL

## (undated) DEVICE — LACTATED RINGERS INJ 1000 ML - (14EA/CA 60CA/PF)

## (undated) DEVICE — TOWEL STOP TIMEOUT SAFETY FLAG (40EA/CA)

## (undated) DEVICE — SUTURE 3-0 VICRYL PLUS CT-1 - 36 INCH (36/BX)

## (undated) DEVICE — GLOVE BIOGEL PI INDICATOR SZ 6.5 SURGICAL PF LF - (50/BX 4BX/CA)

## (undated) DEVICE — SLEEVE, VASO, THIGH, MED

## (undated) DEVICE — KIT ANESTHESIA W/CIRCUIT & 3/LT BAG W/FILTER (20EA/CA)

## (undated) DEVICE — ELECTRODE 850 FOAM ADHESIVE - HYDROGEL RADIOTRNSPRNT (50/PK)

## (undated) DEVICE — CANISTER SUCTION 3000ML MECHANICAL FILTER AUTO SHUTOFF MEDI-VAC NONSTERILE LF DISP  (40EA/CA)

## (undated) DEVICE — PAD SANITARY 11IN MAXI IND WRAPPED  (12EA/PK 24PK/CA)

## (undated) DEVICE — SYRINGE ASEPTO - (50EA/CA

## (undated) DEVICE — MASK ANESTHESIA ADULT  - (100/CA)

## (undated) DEVICE — PROTECTOR ULNA NERVE - (36PR/CA)

## (undated) DEVICE — SET LEADWIRE 5 LEAD BEDSIDE DISPOSABLE ECG (1SET OF 5/EA)

## (undated) DEVICE — HEAD HOLDER JUNIOR/ADULT

## (undated) DEVICE — TUBING CLEARLINK DUO-VENT - C-FLO (48EA/CA)

## (undated) DEVICE — BLADE SURGICAL #11 - (50/BX)

## (undated) DEVICE — KIT SURGIFLO W/OUT THROMBIN - (6EA/CA)

## (undated) DEVICE — GOWN WARMING STANDARD FLEX - (30/CA)

## (undated) DEVICE — DRAPE VAGINAL BIB W/ POUCH (10EA/CA)

## (undated) DEVICE — SUCTION INSTRUMENT YANKAUER BULBOUS TIP W/O VENT (50EA/CA)

## (undated) DEVICE — WATER IRRIG. STER 3000 ML - (4/CA)

## (undated) DEVICE — SENSOR SPO2 NEO LNCS ADHESIVE (20/BX) SEE USER NOTES

## (undated) DEVICE — BRIEF STRETCH MATERNITY M/L - FITS 20-60IN (5EA/BG 20BG/CA)

## (undated) DEVICE — SET IRRIGATION CYSTOSCOPY TUBE L80 IN (20EA/CA)

## (undated) DEVICE — NEPTUNE 4 PORT MANIFOLD - (20/PK)